# Patient Record
(demographics unavailable — no encounter records)

---

## 2025-06-10 NOTE — REVIEW OF SYSTEMS
[Sore Throat] : sore throat [Cough] : cough [Joint Pain] : joint pain [Negative] : Neurological [FreeTextEntry9] : left shoulder

## 2025-06-10 NOTE — ASSESSMENT
[FreeTextEntry1] : Assessment:  Left shoulder pain, likely musculoskeletal in nature (ICD-10: M25.512) with possible osteoarthritis or rotator cuff pathology. Seasonal allergies with associated dry cough and sore throat (ICD-10: J30.9). Differential Diagnoses:  Osteoarthritis of the shoulder (M19.011) Rotator cuff tendinopathy (M75.101) Plan:  Diagnostics: Order X-ray of the left shoulder to assess for osteoarthritis or other structural abnormalities. Medications: Prescribe levocetirizine 5 mg daily for allergy symptoms. Patient Education: Discuss potential benefits of physical therapy for shoulder pain and encourage reconsideration. Lifestyle Modifications and Alternatives: Suggest non-pharmacologic strategies for allergies such as allergen avoidance techniques. Follow-Up:  Schedule follow-up after imaging is completed to discuss findings and reassess the management plan for shoulder pain. Re-evaluate allergy management in 4-6 weeks or sooner if symptoms persist or worsen.  Discussed the importance and benefit of a healthy lifestyle, including a heart-healthy diet such as the Mediterranean diet, regular exercise, and 7 hours of sleep nightly.  Total time 30 min ( 20 min. FTF and 10 min chart review and documentation.)

## 2025-06-10 NOTE — PHYSICAL EXAM
[Normal] : soft, non-tender, non-distended, no masses palpated, no HSM and normal bowel sounds [de-identified] : Tenderness upon palpation of the left shoulder and clavicle; limited range of motion above 90-degree extension due to pain

## 2025-06-10 NOTE — HISTORY OF PRESENT ILLNESS
[FreeTextEntry8] :  The patient is a 63-year-old female presenting with left shoulder pain persisting for several months, with worsening symptoms in the last two months. She reports pain on active and passive motion above 90 degrees of extension and experiences pain during sternoclavicular rotation and palpation of the shoulder and clavicular region. She has been using acupuncture and ibuprofen for management, but she refuses additional medications or physical therapy. She is also experiencing dry cough and sore throat attributed to seasonal allergies without shortness of breath, dizziness, or fever, and she refused Flonase nasal spray.

## 2025-06-10 NOTE — PHYSICAL EXAM
[Normal] : soft, non-tender, non-distended, no masses palpated, no HSM and normal bowel sounds [de-identified] : Tenderness upon palpation of the left shoulder and clavicle; limited range of motion above 90-degree extension due to pain